# Patient Record
Sex: FEMALE | Race: WHITE | NOT HISPANIC OR LATINO
[De-identification: names, ages, dates, MRNs, and addresses within clinical notes are randomized per-mention and may not be internally consistent; named-entity substitution may affect disease eponyms.]

---

## 2021-02-26 DIAGNOSIS — Z01.818 ENCOUNTER FOR OTHER PREPROCEDURAL EXAMINATION: ICD-10-CM

## 2021-02-26 PROBLEM — Z00.00 ENCOUNTER FOR PREVENTIVE HEALTH EXAMINATION: Status: ACTIVE | Noted: 2021-02-26

## 2021-02-27 ENCOUNTER — APPOINTMENT (OUTPATIENT)
Dept: DISASTER EMERGENCY | Facility: CLINIC | Age: 71
End: 2021-02-27

## 2021-02-28 ENCOUNTER — TRANSCRIPTION ENCOUNTER (OUTPATIENT)
Age: 71
End: 2021-02-28

## 2021-02-28 LAB — SARS-COV-2 N GENE NPH QL NAA+PROBE: NOT DETECTED

## 2021-03-13 ENCOUNTER — APPOINTMENT (OUTPATIENT)
Dept: DISASTER EMERGENCY | Facility: CLINIC | Age: 71
End: 2021-03-13

## 2021-09-28 ENCOUNTER — APPOINTMENT (OUTPATIENT)
Dept: DISASTER EMERGENCY | Facility: CLINIC | Age: 71
End: 2021-09-28

## 2021-09-29 LAB — SARS-COV-2 N GENE NPH QL NAA+PROBE: NOT DETECTED

## 2022-09-28 ENCOUNTER — RESULT REVIEW (OUTPATIENT)
Age: 72
End: 2022-09-28

## 2022-12-20 ENCOUNTER — APPOINTMENT (OUTPATIENT)
Dept: PAIN MANAGEMENT | Facility: CLINIC | Age: 72
End: 2022-12-20

## 2022-12-20 VITALS — HEIGHT: 63 IN | BODY MASS INDEX: 46.07 KG/M2 | WEIGHT: 260 LBS

## 2022-12-20 DIAGNOSIS — M48.061 SPINAL STENOSIS, LUMBAR REGION WITHOUT NEUROGENIC CLAUDICATION: ICD-10-CM

## 2022-12-20 DIAGNOSIS — M51.36 OTHER INTERVERTEBRAL DISC DEGENERATION, LUMBAR REGION: ICD-10-CM

## 2022-12-20 DIAGNOSIS — M54.16 RADICULOPATHY, LUMBAR REGION: ICD-10-CM

## 2022-12-20 PROCEDURE — 99214 OFFICE O/P EST MOD 30 MIN: CPT

## 2022-12-20 NOTE — PHYSICAL EXAM
[Right] : right hip [4___] : flexion 4[unfilled]/5 [] : no palpable masses [de-identified] : Constitutional:\par - No acute distress\par - Well developed; well nourished\par \par Neurological:\par - normal mood and affect\par - alert and oriented x 3\par \par Cardiovascular\par - grossly normal\par \par Lumbar Spine Exam:\par \par Inspection:\par erythema (-)\par ecchymosis (-)\par rashes (-)\par alignment: no scoliosis\par \par Palpation:\par paraspinal tenderness:                   L (+) ; R (+)\par thoracic paraspinal tenderness:     L (-) ; R (-)\par sciatic nerve tenderness :              L (-) ; R (-)\par SI joint tenderness:                          L (-) ; R (-)\par GTB tenderness:                             L (-); R (-)\par \par ROM:\par decreased with mild stiffness with extremes of (extension>flexion)\par Pain with extremes of (extension>flexion)\par \par Strength: Right/Left\par Hip Flexion:                  (5/5) (5/5)\par Quadriceps:                (5/5) (5/5)\par Hamstrings:                 (5/5) (5/5)\par Ankle Dorsiflexion:      (5/5) (5/5)\par EHL:                             (5/5) (5/5)\par Ankle Plantarflexion:   (5/5) (5/5)\par \par Special Tests:\par SLR:                            R (-) ; L (-)\par Facet loading:             R (+) ; L (-)\par KAYDEN test:                R (+) ; L (-)\par Eugenio's test:              R (+) ; L (-)\par Tight Hamstrings:        R (+) ; L (+)\par \par Neurologic:\par Light touch intact throughout BLE\par Reflexes normal and symmetric\par \par Gait:\par non-antalgic gait\par ambulates without assistive device

## 2022-12-20 NOTE — ASSESSMENT
[FreeTextEntry1] : A thorough discussion occurred regarding available pain management treatment options including interventional,\par rehabilitative, pharmacological, and alternative modalities with the patient. We will proceed with the following:\par \par Interventional treatment options:\par - Proceed with repeat right PM L5-S1 LESI (80 mg Kenalog) under fluoroscopic guidance\par - Would likely will repeat L4-L5, L5-S1 facet joint MBB for ongoing axial low back pain\par - discussed at length goals of diagnostic injection for MBB\par - Patient counseled regarding office policy regarding sedation in relation to BMI\par - see additional instructions below\par \par Rehabilitative options:\par - completed trial of physical therapy \par - continue to HEP as tolerated \par - Home exercise sheet provided\par \par Medication based treatment options:\par - poor candidate for oral NSAIDS secondary to gastric sleeve\par - continue Tylenol 500-1000 mg TID PRN\par - see additional instructions below\par \par Complementary treatment options:\par - Weight management and lifestyle modifications discussed\par \par Additional treatment recommendations as follows:\par - Follow up 1-2 weeks post injection for assessment of efficacy and further recommendations.\par \par The risks, benefits and alternatives of the proposed procedure were explained in detail with the patient.  The risks outlined include, but are not limited to, infection, bleeding, nerve injury, a temporary increase in pain, failure to resolve symptoms, allergic reaction, and possible elevation of blood sugar in diabetics.  All questions were answered to patient's apparent satisfaction and he/she verbalized an understanding.\par \par I, Kerry Lester, acting as scribe, attest that this documentation has been prepared under the direction and in the presence of Provider Liang Herman DO.\par \par The documentation recorded by the scribe, in my presence, accurately reflects the service I personally performed and the decisions made by me with my edits as appropriate.

## 2022-12-20 NOTE — HISTORY OF PRESENT ILLNESS
[Lower back] : lower back [Sudden] : sudden [7] : 7 [Dull/Aching] : dull/aching [Constant] : constant [Nothing helps with pain getting better] : Nothing helps with pain getting better [Lying in bed] : lying in bed [] : Post Surgical Visit: no [FreeTextEntry1] : GROIN

## 2023-03-29 ENCOUNTER — OFFICE (OUTPATIENT)
Dept: URBAN - METROPOLITAN AREA CLINIC 12 | Facility: CLINIC | Age: 73
Setting detail: OPHTHALMOLOGY
End: 2023-03-29
Payer: MEDICARE

## 2023-03-29 ENCOUNTER — RX ONLY (RX ONLY)
Age: 73
End: 2023-03-29

## 2023-03-29 DIAGNOSIS — H35.40: ICD-10-CM

## 2023-03-29 DIAGNOSIS — H40.053: ICD-10-CM

## 2023-03-29 DIAGNOSIS — E11.9: ICD-10-CM

## 2023-03-29 DIAGNOSIS — H26.493: ICD-10-CM

## 2023-03-29 DIAGNOSIS — H16.223: ICD-10-CM

## 2023-03-29 DIAGNOSIS — Z96.1: ICD-10-CM

## 2023-03-29 DIAGNOSIS — H44.23: ICD-10-CM

## 2023-03-29 PROCEDURE — 92250 FUNDUS PHOTOGRAPHY W/I&R: CPT | Performed by: OPHTHALMOLOGY

## 2023-03-29 PROCEDURE — 99214 OFFICE O/P EST MOD 30 MIN: CPT | Performed by: OPHTHALMOLOGY

## 2023-03-29 PROCEDURE — 92083 EXTENDED VISUAL FIELD XM: CPT | Performed by: OPHTHALMOLOGY

## 2023-03-29 ASSESSMENT — CONFRONTATIONAL VISUAL FIELD TEST (CVF)
OD_FINDINGS: FULL
OS_FINDINGS: FULL

## 2023-03-29 ASSESSMENT — TONOMETRY
OS_IOP_MMHG: 21
OD_IOP_MMHG: 20

## 2023-03-29 ASSESSMENT — SUPERFICIAL PUNCTATE KERATITIS (SPK)
OD_SPK: 1+
OS_SPK: 1+

## 2023-03-30 ASSESSMENT — REFRACTION_CURRENTRX
OS_OVR_VA: 20/
OD_ADD: +0.00
OD_OVR_VA: 20/
OS_VPRISM_DIRECTION: BF
OD_OVR_VA: 20/
OS_CYLINDER: -2.00
OD_VPRISM_DIRECTION: SV
OS_SPHERE: -7.75
OD_SPHERE: +2.50
OS_SPHERE: +2.50
OS_ADD: +2.25
OD_CYLINDER: SPHERE
OD_SPHERE: PLANO
OS_VPRISM_DIRECTION: SV
OD_AXIS: 0
OS_OVR_VA: 20/
OD_VPRISM_DIRECTION: BF
OS_AXIS: 083

## 2023-03-30 ASSESSMENT — KERATOMETRY
OD_K1POWER_DIOPTERS: 43.25
OD_K2POWER_DIOPTERS: 44.75
OS_K1POWER_DIOPTERS: 43.75
OD_AXISANGLE_DEGREES: 154
OS_AXISANGLE_DEGREES: 118
METHOD_AUTO_MANUAL: AUTO
OS_K2POWER_DIOPTERS: 44.00

## 2023-03-30 ASSESSMENT — REFRACTION_MANIFEST
OD_SPHERE: -0.25
OS_SPHERE: +0.25
OS_CYLINDER: -0.50
OS_AXIS: 90
OD_VA1: 20/20
OD_CYLINDER: -0.50
OD_AXIS: 70
OS_VA1: 20/20

## 2023-03-30 ASSESSMENT — REFRACTION_AUTOREFRACTION
OD_AXIS: 69
OD_CYLINDER: -0.50
OS_SPHERE: +0.25
OS_CYLINDER: -0.50
OD_SPHERE: -0.25
OS_AXIS: 88

## 2023-03-30 ASSESSMENT — VISUAL ACUITY
OS_BCVA: 20/25+1
OD_BCVA: 20/20

## 2023-03-30 ASSESSMENT — AXIALLENGTH_DERIVED
OS_AL: 23.455
OD_AL: 23.6031
OS_AL: 23.455
OD_AL: 23.6031

## 2023-03-30 ASSESSMENT — SPHEQUIV_DERIVED
OD_SPHEQUIV: -0.5
OD_SPHEQUIV: -0.5
OS_SPHEQUIV: 0
OS_SPHEQUIV: 0

## 2023-04-16 ENCOUNTER — OFFICE (OUTPATIENT)
Dept: URBAN - METROPOLITAN AREA CLINIC 12 | Facility: CLINIC | Age: 73
Setting detail: OPHTHALMOLOGY
End: 2023-04-16
Payer: MEDICARE

## 2023-04-16 ENCOUNTER — RX ONLY (RX ONLY)
Age: 73
End: 2023-04-16

## 2023-04-16 DIAGNOSIS — H10.413: ICD-10-CM

## 2023-04-16 PROCEDURE — 99213 OFFICE O/P EST LOW 20 MIN: CPT | Performed by: OPTOMETRIST

## 2023-04-16 ASSESSMENT — REFRACTION_CURRENTRX
OD_SPHERE: PLANO
OS_SPHERE: -7.75
OS_VPRISM_DIRECTION: BF
OS_OVR_VA: 20/
OD_ADD: +0.00
OS_ADD: +2.25
OD_OVR_VA: 20/
OS_VPRISM_DIRECTION: SV
OD_VPRISM_DIRECTION: SV
OD_OVR_VA: 20/
OD_VPRISM_DIRECTION: BF
OS_CYLINDER: -2.00
OS_SPHERE: +2.50
OD_SPHERE: +2.50
OD_AXIS: 0
OS_OVR_VA: 20/
OS_AXIS: 083
OD_CYLINDER: SPHERE

## 2023-04-16 ASSESSMENT — REFRACTION_MANIFEST
OD_AXIS: 70
OD_CYLINDER: -0.50
OS_VA1: 20/20
OD_SPHERE: -0.25
OS_SPHERE: +0.25
OS_CYLINDER: -0.50
OS_AXIS: 90
OD_VA1: 20/20

## 2023-04-16 ASSESSMENT — VISUAL ACUITY
OD_BCVA: 20/40
OS_BCVA: 20/25+2

## 2023-04-16 ASSESSMENT — CONFRONTATIONAL VISUAL FIELD TEST (CVF)
OD_FINDINGS: FULL
OS_FINDINGS: FULL

## 2023-04-16 ASSESSMENT — SUPERFICIAL PUNCTATE KERATITIS (SPK)
OS_SPK: 1+
OD_SPK: 1+

## 2023-04-16 ASSESSMENT — KERATOMETRY: METHOD_AUTO_MANUAL: AUTO

## 2023-04-16 ASSESSMENT — SPHEQUIV_DERIVED
OS_SPHEQUIV: 0
OD_SPHEQUIV: -0.5

## 2023-05-21 ENCOUNTER — OFFICE (OUTPATIENT)
Dept: URBAN - METROPOLITAN AREA CLINIC 12 | Facility: CLINIC | Age: 73
Setting detail: OPHTHALMOLOGY
End: 2023-05-21
Payer: MEDICARE

## 2023-05-21 ENCOUNTER — RX ONLY (RX ONLY)
Age: 73
End: 2023-05-21

## 2023-05-21 DIAGNOSIS — H10.413: ICD-10-CM

## 2023-05-21 DIAGNOSIS — H10.45: ICD-10-CM

## 2023-05-21 PROCEDURE — 99213 OFFICE O/P EST LOW 20 MIN: CPT | Performed by: OPTOMETRIST

## 2023-05-21 ASSESSMENT — REFRACTION_CURRENTRX
OD_SPHERE: +2.50
OD_CYLINDER: SPHERE
OD_OVR_VA: 20/
OS_SPHERE: +2.50
OS_CYLINDER: -2.00
OS_VPRISM_DIRECTION: SV
OD_AXIS: 0
OS_SPHERE: -7.75
OD_ADD: +0.00
OS_AXIS: 083
OS_OVR_VA: 20/
OD_VPRISM_DIRECTION: SV
OD_SPHERE: PLANO
OS_OVR_VA: 20/
OD_OVR_VA: 20/
OS_VPRISM_DIRECTION: BF
OS_ADD: +2.25
OD_VPRISM_DIRECTION: BF

## 2023-05-21 ASSESSMENT — SPHEQUIV_DERIVED
OS_SPHEQUIV: 0
OD_SPHEQUIV: -0.5

## 2023-05-21 ASSESSMENT — SUPERFICIAL PUNCTATE KERATITIS (SPK)
OS_SPK: 1+
OD_SPK: 1+

## 2023-05-21 ASSESSMENT — REFRACTION_MANIFEST
OD_AXIS: 70
OS_AXIS: 90
OS_SPHERE: +0.25
OD_SPHERE: -0.25
OD_VA1: 20/20
OS_CYLINDER: -0.50
OS_VA1: 20/20
OD_CYLINDER: -0.50

## 2023-05-21 ASSESSMENT — CONFRONTATIONAL VISUAL FIELD TEST (CVF)
OD_FINDINGS: FULL
OS_FINDINGS: FULL

## 2023-05-21 ASSESSMENT — VISUAL ACUITY
OS_BCVA: 20/30
OD_BCVA: 20/25

## 2023-05-21 ASSESSMENT — KERATOMETRY: METHOD_AUTO_MANUAL: AUTO

## 2023-08-10 ENCOUNTER — OFFICE (OUTPATIENT)
Dept: URBAN - METROPOLITAN AREA CLINIC 100 | Facility: CLINIC | Age: 73
Setting detail: OPHTHALMOLOGY
End: 2023-08-10
Payer: MEDICARE

## 2023-08-10 DIAGNOSIS — H57.13: ICD-10-CM

## 2023-08-10 DIAGNOSIS — H26.493: ICD-10-CM

## 2023-08-10 DIAGNOSIS — E11.9: ICD-10-CM

## 2023-08-10 DIAGNOSIS — H10.45: ICD-10-CM

## 2023-08-10 DIAGNOSIS — Z96.1: ICD-10-CM

## 2023-08-10 PROCEDURE — 92014 COMPRE OPH EXAM EST PT 1/>: CPT | Performed by: OPHTHALMOLOGY

## 2023-08-10 ASSESSMENT — REFRACTION_CURRENTRX
OS_CYLINDER: -2.00
OD_VPRISM_DIRECTION: BF
OD_AXIS: 0
OS_AXIS: 083
OS_ADD: +2.25
OD_ADD: +0.00
OD_OVR_VA: 20/
OS_SPHERE: +2.50
OD_CYLINDER: SPHERE
OS_OVR_VA: 20/
OS_SPHERE: -7.75
OS_VPRISM_DIRECTION: SV
OD_OVR_VA: 20/
OD_VPRISM_DIRECTION: SV
OS_VPRISM_DIRECTION: BF
OS_OVR_VA: 20/
OD_SPHERE: PLANO
OD_SPHERE: +2.50

## 2023-08-10 ASSESSMENT — AXIALLENGTH_DERIVED
OS_AL: 23.3591
OS_AL: 23.455
OD_AL: 23.5544
OD_AL: 23.6031

## 2023-08-10 ASSESSMENT — SUPERFICIAL PUNCTATE KERATITIS (SPK)
OD_SPK: T
OS_SPK: T

## 2023-08-10 ASSESSMENT — SPHEQUIV_DERIVED
OD_SPHEQUIV: -0.5
OD_SPHEQUIV: -0.375
OS_SPHEQUIV: 0
OS_SPHEQUIV: 0.25

## 2023-08-10 ASSESSMENT — REFRACTION_MANIFEST
OD_CYLINDER: -0.50
OS_AXIS: 90
OD_VA1: 20/20
OS_VA1: 20/20
OD_SPHERE: -0.25
OS_SPHERE: +0.25
OD_AXIS: 70
OS_CYLINDER: -0.50

## 2023-08-10 ASSESSMENT — REFRACTION_AUTOREFRACTION
OS_SPHERE: +0.50
OD_CYLINDER: -0.25
OD_AXIS: 104
OS_AXIS: 087
OS_CYLINDER: -0.50
OD_SPHERE: -0.25

## 2023-08-10 ASSESSMENT — KERATOMETRY
METHOD_AUTO_MANUAL: AUTO
OS_AXISANGLE_DEGREES: 137
OD_AXISANGLE_DEGREES: 153
OD_K2POWER_DIOPTERS: 44.50
OS_K1POWER_DIOPTERS: 43.75
OD_K1POWER_DIOPTERS: 43.50
OS_K2POWER_DIOPTERS: 44.00

## 2023-08-10 ASSESSMENT — CONFRONTATIONAL VISUAL FIELD TEST (CVF)
OD_FINDINGS: FULL
OS_FINDINGS: FULL

## 2023-08-10 ASSESSMENT — TONOMETRY
OD_IOP_MMHG: 17
OS_IOP_MMHG: 18

## 2023-08-10 ASSESSMENT — VISUAL ACUITY
OS_BCVA: 20/25-2
OD_BCVA: 20/20

## 2023-08-17 ASSESSMENT — SUPERFICIAL PUNCTATE KERATITIS (SPK)
OD_SPK: T
OS_SPK: T

## 2023-08-18 ENCOUNTER — ASC (OUTPATIENT)
Dept: URBAN - METROPOLITAN AREA SURGERY 8 | Facility: SURGERY | Age: 73
Setting detail: OPHTHALMOLOGY
End: 2023-08-18
Payer: MEDICARE

## 2023-08-18 DIAGNOSIS — H26.491: ICD-10-CM

## 2023-08-18 PROBLEM — H57.13 OCULAR PAIN; BOTH EYES: Status: ACTIVE | Noted: 2023-08-10

## 2023-08-18 PROCEDURE — 66821 AFTER CATARACT LASER SURGERY: CPT | Performed by: OPHTHALMOLOGY

## 2023-08-18 ASSESSMENT — CONFRONTATIONAL VISUAL FIELD TEST (CVF)
OD_FINDINGS: FULL
OS_FINDINGS: FULL

## 2023-08-21 ENCOUNTER — RX ONLY (RX ONLY)
Age: 73
End: 2023-08-21

## 2023-08-21 PROBLEM — H26.493 POSTERIOR CAPSULAR OPACIFICATION;  ,, BOTH EYES: Status: ACTIVE | Noted: 2023-08-18

## 2023-08-21 ASSESSMENT — REFRACTION_CURRENTRX
OS_ADD: +2.25
OD_SPHERE: +2.50
OD_OVR_VA: 20/
OS_SPHERE: +2.50
OD_VPRISM_DIRECTION: SV
OD_SPHERE: PLANO
OD_AXIS: 0
OD_VPRISM_DIRECTION: BF
OS_VPRISM_DIRECTION: BF
OS_AXIS: 083
OD_OVR_VA: 20/
OS_VPRISM_DIRECTION: SV
OD_ADD: +0.00
OS_OVR_VA: 20/
OS_OVR_VA: 20/
OS_SPHERE: -7.75
OD_CYLINDER: SPHERE
OS_CYLINDER: -2.00

## 2023-08-21 ASSESSMENT — AXIALLENGTH_DERIVED
OS_AL: 23.3591
OS_AL: 23.455
OD_AL: 23.6031
OD_AL: 23.5544

## 2023-08-21 ASSESSMENT — REFRACTION_MANIFEST
OD_VA1: 20/20
OS_VA1: 20/20
OD_CYLINDER: -0.50
OD_AXIS: 70
OS_CYLINDER: -0.50
OS_AXIS: 90
OS_SPHERE: +0.25
OD_SPHERE: -0.25

## 2023-08-21 ASSESSMENT — SPHEQUIV_DERIVED
OS_SPHEQUIV: 0.25
OS_SPHEQUIV: 0
OD_SPHEQUIV: -0.375
OD_SPHEQUIV: -0.5

## 2023-08-21 ASSESSMENT — KERATOMETRY
OD_K1POWER_DIOPTERS: 43.50
METHOD_AUTO_MANUAL: AUTO
OD_AXISANGLE_DEGREES: 153
OD_K2POWER_DIOPTERS: 44.50
OS_AXISANGLE_DEGREES: 137
OS_K1POWER_DIOPTERS: 43.75
OS_K2POWER_DIOPTERS: 44.00

## 2023-08-21 ASSESSMENT — REFRACTION_AUTOREFRACTION
OS_CYLINDER: -0.50
OS_SPHERE: +0.50
OD_AXIS: 104
OD_SPHERE: -0.25
OS_AXIS: 087
OD_CYLINDER: -0.25

## 2023-08-21 ASSESSMENT — VISUAL ACUITY
OS_BCVA: 20/25-2
OD_BCVA: 20/20

## 2023-09-15 ENCOUNTER — ASC (OUTPATIENT)
Dept: URBAN - METROPOLITAN AREA SURGERY 8 | Facility: SURGERY | Age: 73
Setting detail: OPHTHALMOLOGY
End: 2023-09-15
Payer: MEDICARE

## 2023-09-15 DIAGNOSIS — H26.491: ICD-10-CM

## 2023-09-15 PROCEDURE — 66821 AFTER CATARACT LASER SURGERY: CPT | Performed by: OPHTHALMOLOGY

## 2023-09-15 ASSESSMENT — KERATOMETRY
OS_AXISANGLE_DEGREES: 137
OD_AXISANGLE_DEGREES: 153
OS_K2POWER_DIOPTERS: 44.00
OS_K1POWER_DIOPTERS: 43.75
OD_K1POWER_DIOPTERS: 43.50
OD_K2POWER_DIOPTERS: 44.50
METHOD_AUTO_MANUAL: AUTO

## 2023-09-15 ASSESSMENT — REFRACTION_AUTOREFRACTION
OD_AXIS: 104
OS_AXIS: 087
OD_CYLINDER: -0.25
OD_SPHERE: -0.25
OS_CYLINDER: -0.50
OS_SPHERE: +0.50

## 2023-09-15 ASSESSMENT — REFRACTION_CURRENTRX
OD_VPRISM_DIRECTION: SV
OD_OVR_VA: 20/
OD_AXIS: 0
OS_OVR_VA: 20/
OD_CYLINDER: SPHERE
OS_SPHERE: -7.75
OS_ADD: +2.25
OS_OVR_VA: 20/
OS_SPHERE: +2.50
OD_SPHERE: +2.50
OD_SPHERE: PLANO
OD_OVR_VA: 20/
OD_VPRISM_DIRECTION: BF
OS_VPRISM_DIRECTION: BF
OD_ADD: +0.00
OS_CYLINDER: -2.00
OS_AXIS: 083
OS_VPRISM_DIRECTION: SV

## 2023-09-15 ASSESSMENT — SPHEQUIV_DERIVED
OD_SPHEQUIV: -0.5
OS_SPHEQUIV: 0
OS_SPHEQUIV: 0.25
OD_SPHEQUIV: -0.375

## 2023-09-15 ASSESSMENT — VISUAL ACUITY
OS_BCVA: 20/25-2
OD_BCVA: 20/20

## 2023-09-15 ASSESSMENT — REFRACTION_MANIFEST
OD_VA1: 20/20
OS_SPHERE: +0.25
OD_AXIS: 70
OD_SPHERE: -0.25
OS_AXIS: 90
OS_CYLINDER: -0.50
OD_CYLINDER: -0.50
OS_VA1: 20/20

## 2023-09-15 ASSESSMENT — AXIALLENGTH_DERIVED
OS_AL: 23.3591
OD_AL: 23.5544
OS_AL: 23.455
OD_AL: 23.6031

## 2023-09-15 ASSESSMENT — CONFRONTATIONAL VISUAL FIELD TEST (CVF)
OD_FINDINGS: FULL
OS_FINDINGS: FULL

## 2023-09-15 ASSESSMENT — SUPERFICIAL PUNCTATE KERATITIS (SPK)
OS_SPK: T
OD_SPK: T

## 2023-10-03 ENCOUNTER — OFFICE (OUTPATIENT)
Dept: URBAN - METROPOLITAN AREA CLINIC 12 | Facility: CLINIC | Age: 73
Setting detail: OPHTHALMOLOGY
End: 2023-10-03
Payer: MEDICARE

## 2023-10-03 DIAGNOSIS — H26.491: ICD-10-CM

## 2023-10-03 PROCEDURE — 99024 POSTOP FOLLOW-UP VISIT: CPT | Performed by: OPTOMETRIST

## 2023-10-03 ASSESSMENT — REFRACTION_MANIFEST
OS_SPHERE: +0.25
OD_AXIS: 70
OD_SPHERE: -0.25
OS_CYLINDER: -0.50
OD_VA1: 20/20
OS_VA1: 20/20
OD_CYLINDER: -0.50
OS_AXIS: 90

## 2023-10-03 ASSESSMENT — KERATOMETRY
METHOD_AUTO_MANUAL: AUTO
OS_K1POWER_DIOPTERS: 44.00
OS_AXISANGLE_DEGREES: 090
OD_K2POWER_DIOPTERS: 44.75
OD_K1POWER_DIOPTERS: 43.50
OD_AXISANGLE_DEGREES: 153
OS_K2POWER_DIOPTERS: 44.00

## 2023-10-03 ASSESSMENT — REFRACTION_CURRENTRX
OS_AXIS: 083
OS_OVR_VA: 20/
OS_VPRISM_DIRECTION: BF
OS_CYLINDER: -2.00
OD_OVR_VA: 20/
OD_ADD: +0.00
OD_OVR_VA: 20/
OS_SPHERE: -7.75
OS_VPRISM_DIRECTION: SV
OD_AXIS: 0
OD_VPRISM_DIRECTION: BF
OD_VPRISM_DIRECTION: SV
OD_CYLINDER: SPHERE
OS_ADD: +2.25
OD_SPHERE: +2.50
OS_OVR_VA: 20/
OD_SPHERE: PLANO
OS_SPHERE: +2.50

## 2023-10-03 ASSESSMENT — VISUAL ACUITY
OS_BCVA: 20/20-2
OD_BCVA: 20/25+2

## 2023-10-03 ASSESSMENT — SPHEQUIV_DERIVED
OS_SPHEQUIV: 0
OD_SPHEQUIV: -0.5
OD_SPHEQUIV: -0.5
OS_SPHEQUIV: -0.125

## 2023-10-03 ASSESSMENT — REFRACTION_AUTOREFRACTION
OS_CYLINDER: -0.75
OD_SPHERE: -0.25
OD_AXIS: 069
OD_CYLINDER: -0.50
OS_AXIS: 086
OS_SPHERE: +0.25

## 2023-10-03 ASSESSMENT — AXIALLENGTH_DERIVED
OD_AL: 23.5572
OD_AL: 23.5572
OS_AL: 23.4097
OS_AL: 23.4577

## 2023-10-03 ASSESSMENT — SUPERFICIAL PUNCTATE KERATITIS (SPK)
OS_SPK: T
OD_SPK: T

## 2023-10-03 ASSESSMENT — CONFRONTATIONAL VISUAL FIELD TEST (CVF)
OS_FINDINGS: FULL
OD_FINDINGS: FULL

## 2023-10-03 ASSESSMENT — TONOMETRY: OD_IOP_MMHG: 20

## 2023-10-11 ENCOUNTER — OFFICE (OUTPATIENT)
Dept: URBAN - METROPOLITAN AREA CLINIC 12 | Facility: CLINIC | Age: 73
Setting detail: OPHTHALMOLOGY
End: 2023-10-11
Payer: MEDICARE

## 2023-10-11 DIAGNOSIS — H02.89: ICD-10-CM

## 2023-10-11 DIAGNOSIS — H16.223: ICD-10-CM

## 2023-10-11 DIAGNOSIS — H10.45: ICD-10-CM

## 2023-10-11 PROCEDURE — 92012 INTRM OPH EXAM EST PATIENT: CPT | Performed by: STUDENT IN AN ORGANIZED HEALTH CARE EDUCATION/TRAINING PROGRAM

## 2023-10-11 ASSESSMENT — SUPERFICIAL PUNCTATE KERATITIS (SPK)
OS_SPK: T
OD_SPK: T

## 2023-10-11 ASSESSMENT — TEAR BREAK UP TIME (TBUT)
OS_TBUT: 1 SECOND
OD_TBUT: 1 SECOND

## 2023-10-11 ASSESSMENT — LID EXAM ASSESSMENTS
OS_MEIBOMITIS: 3+
OD_MEIBOMITIS: 3+

## 2023-10-11 ASSESSMENT — CONFRONTATIONAL VISUAL FIELD TEST (CVF)
OD_FINDINGS: FULL
OS_FINDINGS: FULL

## 2023-10-11 ASSESSMENT — TONOMETRY: OS_IOP_MMHG: 20

## 2023-10-13 ASSESSMENT — REFRACTION_MANIFEST
OD_CYLINDER: -0.50
OS_CYLINDER: -0.50
OD_AXIS: 70
OS_SPHERE: +0.25
OD_SPHERE: -0.25
OS_VA1: 20/20
OS_AXIS: 90
OD_VA1: 20/20

## 2023-10-13 ASSESSMENT — SPHEQUIV_DERIVED
OD_SPHEQUIV: -0.625
OS_SPHEQUIV: 0
OS_SPHEQUIV: 0
OD_SPHEQUIV: -0.5

## 2023-10-13 ASSESSMENT — REFRACTION_AUTOREFRACTION
OD_AXIS: 070
OS_CYLINDER: -0.50
OD_CYLINDER: -0.75
OS_SPHERE: +0.25
OD_SPHERE: -0.25
OS_AXIS: 086

## 2023-10-13 ASSESSMENT — VISUAL ACUITY
OD_BCVA: 20/20
OS_BCVA: 20/20

## 2023-10-13 ASSESSMENT — KERATOMETRY
METHOD_AUTO_MANUAL: AUTO
OS_K2POWER_DIOPTERS: 44.25
OD_AXISANGLE_DEGREES: 156
OS_K1POWER_DIOPTERS: 43.75
OD_K1POWER_DIOPTERS: 43.50
OS_AXISANGLE_DEGREES: 136
OD_K2POWER_DIOPTERS: 45.00

## 2023-10-13 ASSESSMENT — AXIALLENGTH_DERIVED
OS_AL: 23.4097
OS_AL: 23.4097
OD_AL: 23.56
OD_AL: 23.5115

## 2023-10-13 ASSESSMENT — REFRACTION_CURRENTRX
OS_SPHERE: +2.50
OS_VPRISM_DIRECTION: BF
OD_OVR_VA: 20/
OS_CYLINDER: -2.00
OS_OVR_VA: 20/
OS_AXIS: 083
OD_VPRISM_DIRECTION: BF
OD_VPRISM_DIRECTION: SV
OD_SPHERE: +2.50
OS_SPHERE: -7.75
OS_VPRISM_DIRECTION: SV
OD_ADD: +0.00
OD_AXIS: 0
OD_CYLINDER: SPHERE
OS_ADD: +2.25
OS_OVR_VA: 20/
OD_SPHERE: PLANO
OD_OVR_VA: 20/

## 2023-10-17 ENCOUNTER — OFFICE (OUTPATIENT)
Dept: URBAN - METROPOLITAN AREA CLINIC 12 | Facility: CLINIC | Age: 73
Setting detail: OPHTHALMOLOGY
End: 2023-10-17
Payer: MEDICARE

## 2023-10-17 ENCOUNTER — RX ONLY (RX ONLY)
Age: 73
End: 2023-10-17

## 2023-10-17 DIAGNOSIS — H10.45: ICD-10-CM

## 2023-10-17 DIAGNOSIS — H02.89: ICD-10-CM

## 2023-10-17 DIAGNOSIS — H16.223: ICD-10-CM

## 2023-10-17 PROBLEM — H43.813 POSTERIOR VITREOUS DETACHMENT; BOTH EYES: Status: ACTIVE | Noted: 2023-10-11

## 2023-10-17 PROCEDURE — 99212 OFFICE O/P EST SF 10 MIN: CPT | Mod: 24 | Performed by: STUDENT IN AN ORGANIZED HEALTH CARE EDUCATION/TRAINING PROGRAM

## 2023-10-17 ASSESSMENT — REFRACTION_CURRENTRX
OS_OVR_VA: 20/
OD_SPHERE: PLANO
OS_SPHERE: -7.75
OS_CYLINDER: -2.00
OS_SPHERE: +2.50
OD_CYLINDER: SPHERE
OD_VPRISM_DIRECTION: BF
OS_OVR_VA: 20/
OD_OVR_VA: 20/
OS_AXIS: 083
OD_VPRISM_DIRECTION: SV
OD_ADD: +0.00
OS_VPRISM_DIRECTION: BF
OS_ADD: +2.25
OD_OVR_VA: 20/
OD_SPHERE: +2.50
OS_VPRISM_DIRECTION: SV
OD_AXIS: 0

## 2023-10-17 ASSESSMENT — REFRACTION_AUTOREFRACTION
OD_SPHERE: -0.50
OD_CYLINDER: -0.25
OS_AXIS: 105
OS_SPHERE: -0.25
OD_AXIS: 067
OS_CYLINDER: -0.25

## 2023-10-17 ASSESSMENT — SPHEQUIV_DERIVED
OS_SPHEQUIV: 0
OS_SPHEQUIV: -0.375
OD_SPHEQUIV: -0.5
OD_SPHEQUIV: -0.625

## 2023-10-17 ASSESSMENT — REFRACTION_MANIFEST
OD_VA1: 20/20
OS_CYLINDER: -0.50
OD_SPHERE: -0.25
OS_VA1: 20/20
OD_CYLINDER: -0.50
OD_AXIS: 70
OS_SPHERE: +0.25
OS_AXIS: 90

## 2023-10-17 ASSESSMENT — LID EXAM ASSESSMENTS
OD_MEIBOMITIS: 3+
OS_MEIBOMITIS: 3+

## 2023-10-17 ASSESSMENT — TONOMETRY
OS_IOP_MMHG: 17
OD_IOP_MMHG: 15

## 2023-10-17 ASSESSMENT — KERATOMETRY
OD_K1POWER_DIOPTERS: 43.50
OS_K1POWER_DIOPTERS: 43.75
METHOD_AUTO_MANUAL: AUTO
OD_K2POWER_DIOPTERS: 45.00
OD_AXISANGLE_DEGREES: 153
OS_AXISANGLE_DEGREES: 092
OS_K2POWER_DIOPTERS: 44.50

## 2023-10-17 ASSESSMENT — VISUAL ACUITY
OD_BCVA: 20/20
OS_BCVA: 20/20-2

## 2023-10-17 ASSESSMENT — AXIALLENGTH_DERIVED
OD_AL: 23.56
OD_AL: 23.5115
OS_AL: 23.5087
OS_AL: 23.3645

## 2023-10-17 ASSESSMENT — CONFRONTATIONAL VISUAL FIELD TEST (CVF)
OD_FINDINGS: FULL
OS_FINDINGS: FULL

## 2023-10-17 ASSESSMENT — SUPERFICIAL PUNCTATE KERATITIS (SPK)
OD_SPK: T
OS_SPK: T

## 2023-10-17 ASSESSMENT — TEAR BREAK UP TIME (TBUT)
OD_TBUT: 1 SECOND
OS_TBUT: 1 SECOND

## 2023-11-08 ENCOUNTER — RX ONLY (RX ONLY)
Age: 73
End: 2023-11-08

## 2023-11-08 ENCOUNTER — OFFICE (OUTPATIENT)
Dept: URBAN - METROPOLITAN AREA CLINIC 12 | Facility: CLINIC | Age: 73
Setting detail: OPHTHALMOLOGY
End: 2023-11-08
Payer: MEDICARE

## 2023-11-08 DIAGNOSIS — H10.45: ICD-10-CM

## 2023-11-08 DIAGNOSIS — H16.223: ICD-10-CM

## 2023-11-08 DIAGNOSIS — H02.89: ICD-10-CM

## 2023-11-08 PROCEDURE — 99213 OFFICE O/P EST LOW 20 MIN: CPT | Performed by: STUDENT IN AN ORGANIZED HEALTH CARE EDUCATION/TRAINING PROGRAM

## 2023-11-08 ASSESSMENT — REFRACTION_CURRENTRX
OD_OVR_VA: 20/
OD_SPHERE: PLANO
OS_OVR_VA: 20/
OD_SPHERE: +2.50
OD_AXIS: 0
OD_OVR_VA: 20/
OD_VPRISM_DIRECTION: BF
OS_OVR_VA: 20/
OS_CYLINDER: -2.00
OS_ADD: +2.25
OD_VPRISM_DIRECTION: SV
OS_VPRISM_DIRECTION: BF
OS_SPHERE: -7.75
OD_CYLINDER: SPHERE
OD_ADD: +0.00
OS_SPHERE: +2.50
OS_AXIS: 083
OS_VPRISM_DIRECTION: SV

## 2023-11-08 ASSESSMENT — REFRACTION_MANIFEST
OD_AXIS: 70
OD_SPHERE: -0.25
OD_VA1: 20/20
OS_AXIS: 90
OD_CYLINDER: -0.50
OS_CYLINDER: -0.50
OS_VA1: 20/20
OS_SPHERE: +0.25

## 2023-11-08 ASSESSMENT — CONFRONTATIONAL VISUAL FIELD TEST (CVF)
OS_FINDINGS: FULL
OD_FINDINGS: FULL

## 2023-11-08 ASSESSMENT — REFRACTION_AUTOREFRACTION
OD_AXIS: 087
OD_SPHERE: -0.50
OD_CYLINDER: -0.25
OS_CYLINDER: -0.75
OS_SPHERE: +0.25
OS_AXIS: 074

## 2023-11-08 ASSESSMENT — SPHEQUIV_DERIVED
OS_SPHEQUIV: -0.125
OD_SPHEQUIV: -0.625
OS_SPHEQUIV: 0
OD_SPHEQUIV: -0.5

## 2023-11-08 ASSESSMENT — LID EXAM ASSESSMENTS
OS_MEIBOMITIS: 3+
OD_MEIBOMITIS: 3+

## 2023-11-08 ASSESSMENT — TEAR BREAK UP TIME (TBUT)
OD_TBUT: 1 SECOND
OS_TBUT: 1 SECOND

## 2023-11-08 ASSESSMENT — SUPERFICIAL PUNCTATE KERATITIS (SPK)
OS_SPK: T
OD_SPK: T

## 2024-02-24 ENCOUNTER — OFFICE (OUTPATIENT)
Dept: URBAN - METROPOLITAN AREA CLINIC 12 | Facility: CLINIC | Age: 74
Setting detail: OPHTHALMOLOGY
End: 2024-02-24
Payer: MEDICARE

## 2024-02-24 DIAGNOSIS — H10.45: ICD-10-CM

## 2024-02-24 DIAGNOSIS — H52.7: ICD-10-CM

## 2024-02-24 DIAGNOSIS — H02.89: ICD-10-CM

## 2024-02-24 DIAGNOSIS — H16.223: ICD-10-CM

## 2024-02-24 DIAGNOSIS — H52.4: ICD-10-CM

## 2024-02-24 PROCEDURE — 92015 DETERMINE REFRACTIVE STATE: CPT | Performed by: STUDENT IN AN ORGANIZED HEALTH CARE EDUCATION/TRAINING PROGRAM

## 2024-02-24 PROCEDURE — 99213 OFFICE O/P EST LOW 20 MIN: CPT | Performed by: STUDENT IN AN ORGANIZED HEALTH CARE EDUCATION/TRAINING PROGRAM

## 2024-02-24 ASSESSMENT — LID EXAM ASSESSMENTS
OS_MEIBOMITIS: 3+
OD_MEIBOMITIS: 3+

## 2024-02-24 ASSESSMENT — REFRACTION_MANIFEST
OS_ADD: +2.75
OD_SPHERE: -0.50
OS_AXIS: 070
OU_VA: 20/20
OS_VA1: 20/20
OD_ADD: +2.75
OD_VA1: 20/20
OS_CYLINDER: -0.50
OD_CYLINDER: SPHERE
OS_SPHERE: +0.25

## 2024-02-24 ASSESSMENT — REFRACTION_AUTOREFRACTION
OS_AXIS: 071
OS_SPHERE: +0.25
OD_AXIS: 093
OD_CYLINDER: -0.25
OD_SPHERE: -0.50
OS_CYLINDER: -0.50

## 2024-02-24 ASSESSMENT — SUPERFICIAL PUNCTATE KERATITIS (SPK): OD_SPK: T

## 2024-02-24 ASSESSMENT — REFRACTION_CURRENTRX
OD_ADD: +0.00
OD_CYLINDER: SPHERE
OS_CYLINDER: -2.00
OS_ADD: +2.25
OD_VPRISM_DIRECTION: SV
OD_AXIS: 0
OS_SPHERE: -7.75
OD_VPRISM_DIRECTION: BF
OS_AXIS: 083
OD_OVR_VA: 20/
OD_OVR_VA: 20/
OS_VPRISM_DIRECTION: SV
OS_OVR_VA: 20/
OS_VPRISM_DIRECTION: BF
OS_OVR_VA: 20/
OS_SPHERE: +2.50
OD_SPHERE: +2.50
OD_SPHERE: PLANO

## 2024-02-24 ASSESSMENT — TEAR BREAK UP TIME (TBUT)
OS_TBUT: 4 SECOND
OD_TBUT: 4 SECOND

## 2024-02-24 ASSESSMENT — CONFRONTATIONAL VISUAL FIELD TEST (CVF)
OS_FINDINGS: FULL
OD_FINDINGS: FULL

## 2024-02-24 ASSESSMENT — SPHEQUIV_DERIVED
OS_SPHEQUIV: 0
OS_SPHEQUIV: 0
OD_SPHEQUIV: -0.625

## 2024-05-09 ENCOUNTER — OFFICE (OUTPATIENT)
Dept: URBAN - METROPOLITAN AREA CLINIC 12 | Facility: CLINIC | Age: 74
Setting detail: OPHTHALMOLOGY
End: 2024-05-09
Payer: MEDICARE

## 2024-05-09 DIAGNOSIS — H10.9: ICD-10-CM

## 2024-05-09 PROCEDURE — 92012 INTRM OPH EXAM EST PATIENT: CPT | Performed by: OPHTHALMOLOGY

## 2024-05-09 ASSESSMENT — CONFRONTATIONAL VISUAL FIELD TEST (CVF)
OD_FINDINGS: FULL
OS_FINDINGS: FULL

## 2024-05-09 ASSESSMENT — LID EXAM ASSESSMENTS
OD_MEIBOMITIS: 3+
OS_MEIBOMITIS: 3+

## 2024-08-10 ENCOUNTER — OFFICE (OUTPATIENT)
Dept: URBAN - METROPOLITAN AREA CLINIC 12 | Facility: CLINIC | Age: 74
Setting detail: OPHTHALMOLOGY
End: 2024-08-10

## 2024-08-10 DIAGNOSIS — H16.223: ICD-10-CM

## 2024-08-10 DIAGNOSIS — E11.9: ICD-10-CM

## 2024-08-10 DIAGNOSIS — H35.40: ICD-10-CM

## 2024-08-10 DIAGNOSIS — H43.813: ICD-10-CM

## 2024-08-10 DIAGNOSIS — H44.23: ICD-10-CM

## 2024-08-10 PROCEDURE — 92014 COMPRE OPH EXAM EST PT 1/>: CPT | Performed by: STUDENT IN AN ORGANIZED HEALTH CARE EDUCATION/TRAINING PROGRAM

## 2024-08-10 PROCEDURE — 92250 FUNDUS PHOTOGRAPHY W/I&R: CPT | Performed by: STUDENT IN AN ORGANIZED HEALTH CARE EDUCATION/TRAINING PROGRAM

## 2024-08-10 ASSESSMENT — LID EXAM ASSESSMENTS
OS_MEIBOMITIS: 3+
OD_MEIBOMITIS: 3+

## 2024-08-10 ASSESSMENT — CONFRONTATIONAL VISUAL FIELD TEST (CVF)
OD_FINDINGS: FULL
OS_FINDINGS: FULL

## 2024-09-18 ENCOUNTER — OFFICE (OUTPATIENT)
Dept: URBAN - METROPOLITAN AREA CLINIC 12 | Facility: CLINIC | Age: 74
Setting detail: OPHTHALMOLOGY
End: 2024-09-18
Payer: MEDICARE

## 2024-09-18 DIAGNOSIS — H43.813: ICD-10-CM

## 2024-09-18 DIAGNOSIS — H35.40: ICD-10-CM

## 2024-09-18 DIAGNOSIS — H16.223: ICD-10-CM

## 2024-09-18 DIAGNOSIS — H44.23: ICD-10-CM

## 2024-09-18 DIAGNOSIS — E11.9: ICD-10-CM

## 2024-09-18 DIAGNOSIS — Z96.1: ICD-10-CM

## 2024-09-18 PROCEDURE — 92250 FUNDUS PHOTOGRAPHY W/I&R: CPT | Performed by: OPHTHALMOLOGY

## 2024-09-18 PROCEDURE — 92014 COMPRE OPH EXAM EST PT 1/>: CPT | Performed by: OPHTHALMOLOGY

## 2024-09-18 ASSESSMENT — LID EXAM ASSESSMENTS
OS_MEIBOMITIS: 3+
OD_MEIBOMITIS: 3+

## 2024-09-18 ASSESSMENT — CONFRONTATIONAL VISUAL FIELD TEST (CVF)
OS_FINDINGS: FULL
OD_FINDINGS: FULL

## 2024-09-29 ENCOUNTER — OFFICE (OUTPATIENT)
Dept: URBAN - METROPOLITAN AREA CLINIC 12 | Facility: CLINIC | Age: 74
Setting detail: OPHTHALMOLOGY
End: 2024-09-29
Payer: MEDICARE

## 2024-09-29 DIAGNOSIS — H10.503: ICD-10-CM

## 2024-09-29 DIAGNOSIS — Z96.1: ICD-10-CM

## 2024-09-29 DIAGNOSIS — H16.223: ICD-10-CM

## 2024-09-29 PROCEDURE — 92012 INTRM OPH EXAM EST PATIENT: CPT | Performed by: OPHTHALMOLOGY

## 2024-09-29 ASSESSMENT — CONFRONTATIONAL VISUAL FIELD TEST (CVF)
OD_FINDINGS: FULL
OS_FINDINGS: FULL

## 2024-09-29 ASSESSMENT — LID EXAM ASSESSMENTS
OD_MEIBOMITIS: 3+
OS_MEIBOMITIS: 3+

## 2024-10-08 ENCOUNTER — OFFICE (OUTPATIENT)
Dept: URBAN - METROPOLITAN AREA CLINIC 12 | Facility: CLINIC | Age: 74
Setting detail: OPHTHALMOLOGY
End: 2024-10-08
Payer: MEDICARE

## 2024-10-08 DIAGNOSIS — H16.223: ICD-10-CM

## 2024-10-08 PROCEDURE — 99213 OFFICE O/P EST LOW 20 MIN: CPT | Performed by: STUDENT IN AN ORGANIZED HEALTH CARE EDUCATION/TRAINING PROGRAM

## 2024-10-08 ASSESSMENT — TEAR BREAK UP TIME (TBUT)
OS_TBUT: 4 SECOND
OD_TBUT: 4 SECOND

## 2024-10-08 ASSESSMENT — KERATOMETRY: METHOD_AUTO_MANUAL: AUTO

## 2024-10-08 ASSESSMENT — VISUAL ACUITY
OS_BCVA: 20/20
OD_BCVA: 20/20

## 2024-10-08 ASSESSMENT — SUPERFICIAL PUNCTATE KERATITIS (SPK): OD_SPK: T

## 2024-10-08 ASSESSMENT — LID EXAM ASSESSMENTS
OS_MEIBOMITIS: 3+
OD_MEIBOMITIS: 3+

## 2024-10-08 ASSESSMENT — CONFRONTATIONAL VISUAL FIELD TEST (CVF)
OD_FINDINGS: FULL
OS_FINDINGS: FULL

## 2024-11-09 ENCOUNTER — RX ONLY (RX ONLY)
Age: 74
End: 2024-11-09

## 2024-11-09 ENCOUNTER — OFFICE (OUTPATIENT)
Dept: URBAN - METROPOLITAN AREA CLINIC 12 | Facility: CLINIC | Age: 74
Setting detail: OPHTHALMOLOGY
End: 2024-11-09
Payer: MEDICARE

## 2024-11-09 DIAGNOSIS — H16.223: ICD-10-CM

## 2024-11-09 DIAGNOSIS — H02.89: ICD-10-CM

## 2024-11-09 PROCEDURE — 99213 OFFICE O/P EST LOW 20 MIN: CPT | Performed by: STUDENT IN AN ORGANIZED HEALTH CARE EDUCATION/TRAINING PROGRAM

## 2024-11-09 ASSESSMENT — TEAR BREAK UP TIME (TBUT)
OD_TBUT: 4 SECOND
OS_TBUT: 4 SECOND

## 2024-11-09 ASSESSMENT — KERATOMETRY
METHOD_AUTO_MANUAL: AUTO
OD_K1POWER_DIOPTERS: 43.50
OD_K2POWER_DIOPTERS: 44.75
OS_AXISANGLE_DEGREES: 090
OD_AXISANGLE_DEGREES: 155
OS_K2POWER_DIOPTERS: 44.00
OS_K1POWER_DIOPTERS: 44.00

## 2024-11-09 ASSESSMENT — REFRACTION_AUTOREFRACTION
OD_CYLINDER: -0.25
OS_SPHERE: +0.25
OS_AXIS: 099
OD_AXIS: 066
OS_CYLINDER: -0.50
OD_SPHERE: -0.25

## 2024-11-09 ASSESSMENT — VISUAL ACUITY
OS_BCVA: 20/20-
OD_BCVA: 20/25+2

## 2024-11-09 ASSESSMENT — SUPERFICIAL PUNCTATE KERATITIS (SPK): OD_SPK: T

## 2024-11-09 ASSESSMENT — CONFRONTATIONAL VISUAL FIELD TEST (CVF)
OS_FINDINGS: FULL
OD_FINDINGS: FULL

## 2024-11-09 ASSESSMENT — LID EXAM ASSESSMENTS
OD_MEIBOMITIS: 3+
OS_MEIBOMITIS: 3+

## 2024-11-09 ASSESSMENT — TONOMETRY: OD_IOP_MMHG: 18

## 2025-08-09 ENCOUNTER — OFFICE (OUTPATIENT)
Dept: URBAN - METROPOLITAN AREA CLINIC 12 | Facility: CLINIC | Age: 75
Setting detail: OPHTHALMOLOGY
End: 2025-08-09
Payer: MEDICARE

## 2025-08-09 DIAGNOSIS — H44.23: ICD-10-CM

## 2025-08-09 DIAGNOSIS — Z96.1: ICD-10-CM

## 2025-08-09 DIAGNOSIS — H02.89: ICD-10-CM

## 2025-08-09 DIAGNOSIS — H16.223: ICD-10-CM

## 2025-08-09 DIAGNOSIS — H10.503: ICD-10-CM

## 2025-08-09 DIAGNOSIS — E11.9: ICD-10-CM

## 2025-08-09 DIAGNOSIS — H43.813: ICD-10-CM

## 2025-08-09 DIAGNOSIS — H35.40: ICD-10-CM

## 2025-08-09 PROCEDURE — 92014 COMPRE OPH EXAM EST PT 1/>: CPT | Performed by: STUDENT IN AN ORGANIZED HEALTH CARE EDUCATION/TRAINING PROGRAM

## 2025-08-09 ASSESSMENT — KERATOMETRY
OS_K1POWER_DIOPTERS: 44.00
OD_AXISANGLE_DEGREES: 154
OS_K2POWER_DIOPTERS: 44.00
OD_K2POWER_DIOPTERS: 44.50
METHOD_AUTO_MANUAL: AUTO
OD_K1POWER_DIOPTERS: 43.50
OS_AXISANGLE_DEGREES: 090

## 2025-08-09 ASSESSMENT — REFRACTION_CURRENTRX
OD_OVR_VA: 20/
OD_VPRISM_DIRECTION: SV
OS_OVR_VA: 20/
OD_ADD: +2.50
OS_VPRISM_DIRECTION: SV
OS_ADD: +2.50

## 2025-08-09 ASSESSMENT — REFRACTION_AUTOREFRACTION
OD_AXIS: 075
OD_CYLINDER: -0.25
OS_AXIS: 082
OD_SPHERE: -0.25
OS_CYLINDER: -0.75
OS_SPHERE: +0.75

## 2025-08-09 ASSESSMENT — CONFRONTATIONAL VISUAL FIELD TEST (CVF)
OD_FINDINGS: FULL
OS_FINDINGS: FULL

## 2025-08-09 ASSESSMENT — LID EXAM ASSESSMENTS
OS_MEIBOMITIS: 3+
OD_MEIBOMITIS: 3+

## 2025-08-09 ASSESSMENT — TEAR BREAK UP TIME (TBUT)
OS_TBUT: 4 SECOND
OD_TBUT: 4 SECOND

## 2025-08-09 ASSESSMENT — VISUAL ACUITY
OS_BCVA: 20/20-2
OD_BCVA: 20/20-2

## 2025-08-09 ASSESSMENT — SUPERFICIAL PUNCTATE KERATITIS (SPK): OD_SPK: T

## 2025-08-09 ASSESSMENT — TONOMETRY
OS_IOP_MMHG: 17
OD_IOP_MMHG: 15